# Patient Record
Sex: FEMALE | Race: WHITE | NOT HISPANIC OR LATINO
[De-identification: names, ages, dates, MRNs, and addresses within clinical notes are randomized per-mention and may not be internally consistent; named-entity substitution may affect disease eponyms.]

---

## 2017-01-20 ENCOUNTER — APPOINTMENT (OUTPATIENT)
Dept: HEART AND VASCULAR | Facility: CLINIC | Age: 42
End: 2017-01-20

## 2017-01-21 LAB
ALBUMIN SERPL ELPH-MCNC: 4.3 G/DL
ALP BLD-CCNC: 48 U/L
ALT SERPL-CCNC: 14 U/L
ANION GAP SERPL CALC-SCNC: 14 MMOL/L
AST SERPL-CCNC: 20 U/L
BASOPHILS # BLD AUTO: 0.01 K/UL
BASOPHILS NFR BLD AUTO: 0.1 %
BILIRUB SERPL-MCNC: 0.5 MG/DL
BUN SERPL-MCNC: 16 MG/DL
CALCIUM SERPL-MCNC: 9.1 MG/DL
CHLORIDE SERPL-SCNC: 98 MMOL/L
CHOLEST SERPL-MCNC: 258 MG/DL
CHOLEST/HDLC SERPL: 4 RATIO
CO2 SERPL-SCNC: 25 MMOL/L
CREAT SERPL-MCNC: 0.86 MG/DL
EOSINOPHIL # BLD AUTO: 0.1 K/UL
EOSINOPHIL NFR BLD AUTO: 1.4 %
GLUCOSE SERPL-MCNC: 85 MG/DL
HCT VFR BLD CALC: 42.9 %
HDLC SERPL-MCNC: 64 MG/DL
HGB BLD-MCNC: 14.2 G/DL
IMM GRANULOCYTES NFR BLD AUTO: 0.3 %
LDLC SERPL CALC-MCNC: 166 MG/DL
LYMPHOCYTES # BLD AUTO: 2.19 K/UL
LYMPHOCYTES NFR BLD AUTO: 31.2 %
MAN DIFF?: NORMAL
MCHC RBC-ENTMCNC: 30.5 PG
MCHC RBC-ENTMCNC: 33.1 GM/DL
MCV RBC AUTO: 92.3 FL
MONOCYTES # BLD AUTO: 0.37 K/UL
MONOCYTES NFR BLD AUTO: 5.3 %
NEUTROPHILS # BLD AUTO: 4.34 K/UL
NEUTROPHILS NFR BLD AUTO: 61.7 %
PLATELET # BLD AUTO: 184 K/UL
POTASSIUM SERPL-SCNC: 4.7 MMOL/L
PROT SERPL-MCNC: 7 G/DL
RBC # BLD: 4.65 M/UL
RBC # FLD: 12.4 %
SODIUM SERPL-SCNC: 137 MMOL/L
TRIGL SERPL-MCNC: 142 MG/DL
WBC # FLD AUTO: 7.03 K/UL

## 2017-01-23 ENCOUNTER — APPOINTMENT (OUTPATIENT)
Dept: HEART AND VASCULAR | Facility: CLINIC | Age: 42
End: 2017-01-23

## 2017-01-23 ENCOUNTER — RESULT REVIEW (OUTPATIENT)
Age: 42
End: 2017-01-23

## 2017-01-23 VITALS
SYSTOLIC BLOOD PRESSURE: 112 MMHG | DIASTOLIC BLOOD PRESSURE: 70 MMHG | HEIGHT: 62 IN | HEART RATE: 76 BPM | BODY MASS INDEX: 23.55 KG/M2 | RESPIRATION RATE: 16 BRPM | WEIGHT: 128 LBS

## 2017-01-23 RX ORDER — AZITHROMYCIN 250 MG/1
250 TABLET, FILM COATED ORAL
Qty: 6 | Refills: 0 | Status: DISCONTINUED | COMMUNITY
Start: 2016-11-11

## 2017-01-26 DIAGNOSIS — N39.3 STRESS INCONTINENCE (FEMALE) (MALE): ICD-10-CM

## 2017-01-26 DIAGNOSIS — Z86.19 PERSONAL HISTORY OF OTHER INFECTIOUS AND PARASITIC DISEASES: ICD-10-CM

## 2017-01-26 DIAGNOSIS — Z87.19 PERSONAL HISTORY OF OTHER DISEASES OF THE DIGESTIVE SYSTEM: ICD-10-CM

## 2017-01-26 DIAGNOSIS — K21.9 GASTRO-ESOPHAGEAL REFLUX DISEASE W/OUT ESOPHAGITIS: ICD-10-CM

## 2017-01-26 DIAGNOSIS — F41.8 OTHER SPECIFIED ANXIETY DISORDERS: ICD-10-CM

## 2017-01-26 DIAGNOSIS — N60.19 DIFFUSE CYSTIC MASTOPATHY OF UNSPECIFIED BREAST: ICD-10-CM

## 2017-01-26 DIAGNOSIS — Z80.9 FAMILY HISTORY OF MALIGNANT NEOPLASM, UNSPECIFIED: ICD-10-CM

## 2017-12-06 ENCOUNTER — RESULT REVIEW (OUTPATIENT)
Age: 42
End: 2017-12-06

## 2018-11-20 PROBLEM — N60.19 FIBROCYSTIC BREAST: Status: ACTIVE | Noted: 2018-11-20

## 2018-11-20 PROBLEM — N39.3 STRESS INCONTINENCE IN FEMALE: Status: ACTIVE | Noted: 2018-11-20

## 2018-11-20 PROBLEM — F41.8 ANXIETY ABOUT HEALTH: Status: ACTIVE | Noted: 2018-11-20

## 2018-11-20 PROBLEM — Z80.9 FAMILY HISTORY OF MALIGNANT NEOPLASM: Status: ACTIVE | Noted: 2018-11-20

## 2018-11-20 PROBLEM — Z86.19 HISTORY OF HPV INFECTION: Status: RESOLVED | Noted: 2018-11-20 | Resolved: 2018-11-20

## 2018-11-20 PROBLEM — K21.9 GERD (GASTROESOPHAGEAL REFLUX DISEASE): Status: ACTIVE | Noted: 2018-11-20

## 2018-11-20 PROBLEM — Z87.19 HISTORY OF RECTAL FISSURE: Status: RESOLVED | Noted: 2018-11-20 | Resolved: 2018-11-20

## 2018-11-20 LAB — CYTOLOGY CVX/VAG DOC THIN PREP: NORMAL

## 2018-12-13 ENCOUNTER — APPOINTMENT (OUTPATIENT)
Dept: OBGYN | Facility: CLINIC | Age: 43
End: 2018-12-13

## 2018-12-13 ENCOUNTER — APPOINTMENT (OUTPATIENT)
Dept: OBGYN | Facility: CLINIC | Age: 43
End: 2018-12-13
Payer: COMMERCIAL

## 2018-12-13 VITALS — SYSTOLIC BLOOD PRESSURE: 120 MMHG | BODY MASS INDEX: 24.88 KG/M2 | DIASTOLIC BLOOD PRESSURE: 80 MMHG | WEIGHT: 136 LBS

## 2018-12-13 DIAGNOSIS — Z01.419 ENCOUNTER FOR GYNECOLOGICAL EXAMINATION (GENERAL) (ROUTINE) W/OUT ABNORMAL FINDINGS: ICD-10-CM

## 2018-12-13 PROCEDURE — 99396 PREV VISIT EST AGE 40-64: CPT

## 2018-12-20 LAB
CYTOLOGY CVX/VAG DOC THIN PREP: NORMAL
HPV HIGH+LOW RISK DNA PNL CVX: NOT DETECTED

## 2019-04-09 ENCOUNTER — TRANSCRIPTION ENCOUNTER (OUTPATIENT)
Age: 44
End: 2019-04-09

## 2019-04-09 DIAGNOSIS — R92.2 INCONCLUSIVE MAMMOGRAM: ICD-10-CM

## 2019-04-09 DIAGNOSIS — Z12.31 ENCOUNTER FOR SCREENING MAMMOGRAM FOR MALIGNANT NEOPLASM OF BREAST: ICD-10-CM

## 2019-05-31 ENCOUNTER — RESULT REVIEW (OUTPATIENT)
Age: 44
End: 2019-05-31

## 2019-08-02 ENCOUNTER — TRANSCRIPTION ENCOUNTER (OUTPATIENT)
Age: 44
End: 2019-08-02

## 2019-12-18 ENCOUNTER — APPOINTMENT (OUTPATIENT)
Dept: OBGYN | Facility: CLINIC | Age: 44
End: 2019-12-18
Payer: COMMERCIAL

## 2019-12-18 VITALS
SYSTOLIC BLOOD PRESSURE: 120 MMHG | HEIGHT: 62 IN | DIASTOLIC BLOOD PRESSURE: 70 MMHG | WEIGHT: 135 LBS | BODY MASS INDEX: 24.84 KG/M2

## 2019-12-18 DIAGNOSIS — Z01.419 ENCOUNTER FOR GYNECOLOGICAL EXAMINATION (GENERAL) (ROUTINE) W/OUT ABNORMAL FINDINGS: ICD-10-CM

## 2019-12-18 PROCEDURE — 99396 PREV VISIT EST AGE 40-64: CPT

## 2019-12-20 NOTE — HISTORY OF PRESENT ILLNESS
[Good] : being in good health [1 Year Ago] : 1 year ago [Regular Exercise] : regular exercise [Healthy Diet] : a healthy diet [Definite:  ___ (Date)] : the last menstrual period was [unfilled] [Normal Amount/Duration] : was of a normal amount and duration [Regular Cycle Intervals] : periods have been regular [Menstrual Cramps] : no menstrual cramps [Spotting Between  Menses] : spotting reported between menses [Monogamous] : is monogamous [Sexually Active] : is sexually active

## 2020-01-02 LAB
CYTOLOGY CVX/VAG DOC THIN PREP: NORMAL
HPV HIGH+LOW RISK DNA PNL CVX: NOT DETECTED

## 2020-01-17 ENCOUNTER — APPOINTMENT (OUTPATIENT)
Dept: HEART AND VASCULAR | Facility: CLINIC | Age: 45
End: 2020-01-17
Payer: COMMERCIAL

## 2020-01-17 VITALS
DIASTOLIC BLOOD PRESSURE: 70 MMHG | SYSTOLIC BLOOD PRESSURE: 130 MMHG | OXYGEN SATURATION: 98 % | BODY MASS INDEX: 24.98 KG/M2 | WEIGHT: 141 LBS | HEIGHT: 63 IN | HEART RATE: 79 BPM | RESPIRATION RATE: 12 BRPM

## 2020-01-17 PROCEDURE — 99214 OFFICE O/P EST MOD 30 MIN: CPT

## 2020-01-17 RX ORDER — BETAMETHASONE DIPROPIONATE 0.5 MG/G
0.05 OINTMENT, AUGMENTED TOPICAL
Qty: 15 | Refills: 0 | Status: DISCONTINUED | COMMUNITY
Start: 2016-11-11 | End: 2020-01-17

## 2020-01-17 RX ORDER — ESCITALOPRAM OXALATE 10 MG/1
10 TABLET, FILM COATED ORAL DAILY
Refills: 0 | Status: DISCONTINUED | COMMUNITY
End: 2020-01-17

## 2020-01-17 RX ORDER — PANTOPRAZOLE 20 MG/1
20 TABLET, DELAYED RELEASE ORAL
Refills: 0 | Status: DISCONTINUED | COMMUNITY
End: 2020-01-17

## 2020-01-17 NOTE — DISCUSSION/SUMMARY
[Cardiomyopathy] : cardiomyopathy [Stable] : stable [Sodium Restriction] : sodium restriction [Likely Cardiac Ischemia (Hi Prob)] : likely cardiac ischemia (high probability) [Dietary Modification] : dietary modification [Exercise Regimen] : an exercise regimen [Hyperlipidemia] : hyperlipidemia [Deteriorating] : deteriorating [None] : none [Diet Modification] : diet modification [Exercise] : exercise [Patient] : the patient

## 2020-01-17 NOTE — HISTORY OF PRESENT ILLNESS
[FreeTextEntry1] : Mercedes Mcdowell returns for follow up.  She denies cp, sob, pnd, orthopnea, edema, palp, or loc.  \par \par She has intermittent dizziness with position changes.\par \par She is active but not exercising.  \par \par She is resistant to statins for lipid lowering at this time but lipids are markedly abnl.\par \par Lengthy discussion regarding CV risk factor management, goal setting and commitment to process.\par \par Will get updated CV evaluation for risk stratification and then make recommendation regarding medication and lifestyle modifications.

## 2020-01-17 NOTE — PHYSICAL EXAM
[General Appearance - Well Developed] : well developed [Normal Appearance] : normal appearance [Well Groomed] : well groomed [General Appearance - Well Nourished] : well nourished [No Deformities] : no deformities [General Appearance - In No Acute Distress] : no acute distress [Normal Conjunctiva] : the conjunctiva exhibited no abnormalities [Eyelids - No Xanthelasma] : the eyelids demonstrated no xanthelasmas [Normal Oral Mucosa] : normal oral mucosa [No Oral Pallor] : no oral pallor [No Oral Cyanosis] : no oral cyanosis [Normal Jugular Venous A Waves Present] : normal jugular venous A waves present [Normal Jugular Venous V Waves Present] : normal jugular venous V waves present [No Jugular Venous Storm A Waves] : no jugular venous storm A waves [Respiration, Rhythm And Depth] : normal respiratory rhythm and effort [Exaggerated Use Of Accessory Muscles For Inspiration] : no accessory muscle use [Auscultation Breath Sounds / Voice Sounds] : lungs were clear to auscultation bilaterally [Heart Rate And Rhythm] : heart rate and rhythm were normal [Heart Sounds] : normal S1 and S2 [Murmurs] : no murmurs present [Abdomen Soft] : soft [Abdomen Tenderness] : non-tender [Abdomen Mass (___ Cm)] : no abdominal mass palpated [Abnormal Walk] : normal gait [Gait - Sufficient For Exercise Testing] : the gait was sufficient for exercise testing [Nail Clubbing] : no clubbing of the fingernails [Cyanosis, Localized] : no localized cyanosis [Petechial Hemorrhages (___cm)] : no petechial hemorrhages [] : no rash [Skin Color & Pigmentation] : normal skin color and pigmentation [No Venous Stasis] : no venous stasis [Skin Lesions] : no skin lesions [No Skin Ulcers] : no skin ulcer [No Xanthoma] : no  xanthoma was observed

## 2020-01-23 ENCOUNTER — APPOINTMENT (OUTPATIENT)
Dept: HEART AND VASCULAR | Facility: CLINIC | Age: 45
End: 2020-01-23
Payer: COMMERCIAL

## 2020-01-23 VITALS
HEIGHT: 63 IN | WEIGHT: 141 LBS | HEART RATE: 88 BPM | DIASTOLIC BLOOD PRESSURE: 82 MMHG | BODY MASS INDEX: 24.98 KG/M2 | SYSTOLIC BLOOD PRESSURE: 104 MMHG

## 2020-01-23 PROCEDURE — 93325 DOPPLER ECHO COLOR FLOW MAPG: CPT

## 2020-01-23 PROCEDURE — 93351 STRESS TTE COMPLETE: CPT

## 2020-01-23 PROCEDURE — 93320 DOPPLER ECHO COMPLETE: CPT

## 2020-01-27 ENCOUNTER — RESULT REVIEW (OUTPATIENT)
Age: 45
End: 2020-01-27

## 2020-03-09 ENCOUNTER — APPOINTMENT (OUTPATIENT)
Dept: HEART AND VASCULAR | Facility: CLINIC | Age: 45
End: 2020-03-09
Payer: COMMERCIAL

## 2020-03-09 PROCEDURE — 94729 DIFFUSING CAPACITY: CPT

## 2020-03-09 PROCEDURE — 94727 GAS DIL/WSHOT DETER LNG VOL: CPT

## 2020-03-09 PROCEDURE — 94621 CARDIOPULM EXERCISE TESTING: CPT

## 2020-03-09 PROCEDURE — 94010 BREATHING CAPACITY TEST: CPT | Mod: 59

## 2020-03-13 ENCOUNTER — APPOINTMENT (OUTPATIENT)
Dept: HEART AND VASCULAR | Facility: CLINIC | Age: 45
End: 2020-03-13
Payer: COMMERCIAL

## 2020-03-13 ENCOUNTER — LABORATORY RESULT (OUTPATIENT)
Age: 45
End: 2020-03-13

## 2020-03-13 PROCEDURE — 36415 COLL VENOUS BLD VENIPUNCTURE: CPT

## 2020-03-14 LAB
ALBUMIN SERPL ELPH-MCNC: 4.5 G/DL
ALP BLD-CCNC: 52 U/L
ALT SERPL-CCNC: 12 U/L
ANION GAP SERPL CALC-SCNC: 15 MMOL/L
AST SERPL-CCNC: 17 U/L
BASOPHILS # BLD AUTO: 0.04 K/UL
BASOPHILS NFR BLD AUTO: 0.8 %
BILIRUB SERPL-MCNC: 0.4 MG/DL
BUN SERPL-MCNC: 16 MG/DL
CALCIUM SERPL-MCNC: 9.2 MG/DL
CHLORIDE SERPL-SCNC: 98 MMOL/L
CHOLEST SERPL-MCNC: 243 MG/DL
CHOLEST/HDLC SERPL: 4.1 RATIO
CO2 SERPL-SCNC: 22 MMOL/L
CREAT SERPL-MCNC: 0.81 MG/DL
EOSINOPHIL # BLD AUTO: 0.14 K/UL
EOSINOPHIL NFR BLD AUTO: 2.9 %
GLUCOSE SERPL-MCNC: 78 MG/DL
HCT VFR BLD CALC: 44.4 %
HDLC SERPL-MCNC: 60 MG/DL
HGB BLD-MCNC: 14 G/DL
IMM GRANULOCYTES NFR BLD AUTO: 0.2 %
LDLC SERPL CALC-MCNC: 159 MG/DL
LYMPHOCYTES # BLD AUTO: 2.01 K/UL
LYMPHOCYTES NFR BLD AUTO: 41.2 %
MAN DIFF?: NORMAL
MCHC RBC-ENTMCNC: 30.4 PG
MCHC RBC-ENTMCNC: 31.5 GM/DL
MCV RBC AUTO: 96.3 FL
MONOCYTES # BLD AUTO: 0.39 K/UL
MONOCYTES NFR BLD AUTO: 8 %
NEUTROPHILS # BLD AUTO: 2.29 K/UL
NEUTROPHILS NFR BLD AUTO: 46.9 %
PLATELET # BLD AUTO: 199 K/UL
POTASSIUM SERPL-SCNC: 4.3 MMOL/L
PROT SERPL-MCNC: 7.1 G/DL
RBC # BLD: 4.61 M/UL
RBC # FLD: 12.5 %
SODIUM SERPL-SCNC: 136 MMOL/L
TRIGL SERPL-MCNC: 121 MG/DL
WBC # FLD AUTO: 4.88 K/UL

## 2020-03-20 ENCOUNTER — APPOINTMENT (OUTPATIENT)
Dept: HEART AND VASCULAR | Facility: CLINIC | Age: 45
End: 2020-03-20
Payer: COMMERCIAL

## 2020-03-20 PROCEDURE — 99443: CPT

## 2020-06-24 ENCOUNTER — RESULT REVIEW (OUTPATIENT)
Age: 45
End: 2020-06-24

## 2020-07-22 ENCOUNTER — APPOINTMENT (OUTPATIENT)
Dept: HEART AND VASCULAR | Facility: CLINIC | Age: 45
End: 2020-07-22
Payer: COMMERCIAL

## 2020-07-22 PROCEDURE — 36415 COLL VENOUS BLD VENIPUNCTURE: CPT

## 2020-07-23 LAB
24R-OH-CALCIDIOL SERPL-MCNC: 73.6 PG/ML
ALBUMIN SERPL ELPH-MCNC: 4.4 G/DL
ALP BLD-CCNC: 50 U/L
ALT SERPL-CCNC: 12 U/L
ANION GAP SERPL CALC-SCNC: 11 MMOL/L
AST SERPL-CCNC: 18 U/L
BASOPHILS # BLD AUTO: 0.02 K/UL
BASOPHILS NFR BLD AUTO: 0.3 %
BILIRUB SERPL-MCNC: 0.5 MG/DL
BUN SERPL-MCNC: 12 MG/DL
CALCIUM SERPL-MCNC: 9.5 MG/DL
CHLORIDE SERPL-SCNC: 100 MMOL/L
CHOLEST SERPL-MCNC: 195 MG/DL
CHOLEST/HDLC SERPL: 2.8 RATIO
CO2 SERPL-SCNC: 27 MMOL/L
CREAT SERPL-MCNC: 0.8 MG/DL
EOSINOPHIL # BLD AUTO: 0.14 K/UL
EOSINOPHIL NFR BLD AUTO: 2.2 %
GLUCOSE SERPL-MCNC: 85 MG/DL
HCT VFR BLD CALC: 45.2 %
HDLC SERPL-MCNC: 69 MG/DL
HGB BLD-MCNC: 14.2 G/DL
IMM GRANULOCYTES NFR BLD AUTO: 0.3 %
LDLC SERPL CALC-MCNC: 100 MG/DL
LYMPHOCYTES # BLD AUTO: 2.23 K/UL
LYMPHOCYTES NFR BLD AUTO: 34.8 %
MAN DIFF?: NORMAL
MCHC RBC-ENTMCNC: 30.5 PG
MCHC RBC-ENTMCNC: 31.4 GM/DL
MCV RBC AUTO: 97 FL
MONOCYTES # BLD AUTO: 0.51 K/UL
MONOCYTES NFR BLD AUTO: 8 %
NEUTROPHILS # BLD AUTO: 3.48 K/UL
NEUTROPHILS NFR BLD AUTO: 54.4 %
PLATELET # BLD AUTO: 213 K/UL
POTASSIUM SERPL-SCNC: 4.5 MMOL/L
PROT SERPL-MCNC: 6.8 G/DL
RBC # BLD: 4.66 M/UL
RBC # FLD: 12.3 %
SODIUM SERPL-SCNC: 138 MMOL/L
TRIGL SERPL-MCNC: 130 MG/DL
WBC # FLD AUTO: 6.4 K/UL

## 2020-12-16 PROBLEM — Z01.419 ENCOUNTER FOR GYNECOLOGICAL EXAMINATION WITHOUT ABNORMAL FINDING: Status: RESOLVED | Noted: 2018-12-13 | Resolved: 2020-12-16

## 2020-12-21 PROBLEM — Z01.419 ENCOUNTER FOR ANNUAL ROUTINE GYNECOLOGICAL EXAMINATION: Status: RESOLVED | Noted: 2019-12-20 | Resolved: 2020-12-21

## 2021-01-22 ENCOUNTER — APPOINTMENT (OUTPATIENT)
Dept: OBGYN | Facility: CLINIC | Age: 46
End: 2021-01-22
Payer: COMMERCIAL

## 2021-01-22 VITALS
HEIGHT: 63 IN | WEIGHT: 142 LBS | DIASTOLIC BLOOD PRESSURE: 72 MMHG | BODY MASS INDEX: 25.16 KG/M2 | TEMPERATURE: 97.6 F | SYSTOLIC BLOOD PRESSURE: 110 MMHG

## 2021-01-22 DIAGNOSIS — Z11.51 ENCOUNTER FOR SCREENING FOR HUMAN PAPILLOMAVIRUS (HPV): ICD-10-CM

## 2021-01-22 DIAGNOSIS — N92.1 EXCESSIVE AND FREQUENT MENSTRUATION WITH IRREGULAR CYCLE: ICD-10-CM

## 2021-01-22 PROCEDURE — 99072 ADDL SUPL MATRL&STAF TM PHE: CPT

## 2021-01-22 PROCEDURE — 99396 PREV VISIT EST AGE 40-64: CPT

## 2021-01-26 PROBLEM — Z11.51 SCREENING FOR HPV (HUMAN PAPILLOMAVIRUS): Status: ACTIVE | Noted: 2019-12-20

## 2021-01-26 NOTE — HISTORY OF PRESENT ILLNESS
[FreeTextEntry1] : 45 y o P3 female presents for routine annual GYN care\par Last pap smear 12/2019 was NILM HPV negative\par Denies GYN complaints other than metrorrhagia noted over the last year\par Also increased UTI sx's which she attributes to her spouses semen irritating her: she used condoms regularly until recently when he had vasectomy\par Reports normal bowel/bladder function\par Breast imaging in 6/2020 was benign BI-RADS 1

## 2021-01-27 ENCOUNTER — RESULT REVIEW (OUTPATIENT)
Age: 46
End: 2021-01-27

## 2021-02-11 ENCOUNTER — TRANSCRIPTION ENCOUNTER (OUTPATIENT)
Age: 46
End: 2021-02-11

## 2021-03-08 ENCOUNTER — TRANSCRIPTION ENCOUNTER (OUTPATIENT)
Age: 46
End: 2021-03-08

## 2021-04-20 ENCOUNTER — NON-APPOINTMENT (OUTPATIENT)
Age: 46
End: 2021-04-20

## 2021-05-27 ENCOUNTER — APPOINTMENT (OUTPATIENT)
Dept: HEART AND VASCULAR | Facility: CLINIC | Age: 46
End: 2021-05-27
Payer: COMMERCIAL

## 2021-05-27 ENCOUNTER — NON-APPOINTMENT (OUTPATIENT)
Age: 46
End: 2021-05-27

## 2021-05-27 VITALS
HEIGHT: 63 IN | DIASTOLIC BLOOD PRESSURE: 64 MMHG | TEMPERATURE: 97.9 F | BODY MASS INDEX: 23.92 KG/M2 | OXYGEN SATURATION: 100 % | HEART RATE: 74 BPM | SYSTOLIC BLOOD PRESSURE: 100 MMHG | RESPIRATION RATE: 12 BRPM | WEIGHT: 135 LBS

## 2021-05-27 DIAGNOSIS — Z00.00 ENCOUNTER FOR GENERAL ADULT MEDICAL EXAMINATION W/OUT ABNORMAL FINDINGS: ICD-10-CM

## 2021-05-27 DIAGNOSIS — I51.89 OTHER ILL-DEFINED HEART DISEASES: ICD-10-CM

## 2021-05-27 PROCEDURE — 99072 ADDL SUPL MATRL&STAF TM PHE: CPT

## 2021-05-27 PROCEDURE — 99215 OFFICE O/P EST HI 40 MIN: CPT

## 2021-05-27 PROCEDURE — 93000 ELECTROCARDIOGRAM COMPLETE: CPT

## 2021-05-27 NOTE — REASON FOR VISIT
[Arrhythmia/ECG Abnorrmalities] : arrhythmia/ECG abnormalities [FreeTextEntry3] : Vaishali Sadler [Follow-Up - Clinic] : a clinic follow-up of [Cardiomyopathy] : cardiomyopathy [Coronary Artery Disease] : coronary artery disease [Hyperlipidemia] : hyperlipidemia

## 2021-05-27 NOTE — HISTORY OF PRESENT ILLNESS
[FreeTextEntry1] : Mercedes Mcdowell returns for follow up.  \par \par She denies cp, sob, pnd, orthopnea, edema, palp, or loc.  \par \par She is active.  She is compliant with meds\par \par ECG today reveals NST, TWI consider ischemia (unchanged).\par \par EXSE 1/2020: nl lv sys fxn; nl acosta fxn; 12:30 min Fede; no ischemia\par CPET 3/2020: ischemic myocardial dysfxn; 114% predicted peak VO2 (improved)\par \par Clinical hx and results reviewed in detail.\par \par Recommendations:\par 1. exercise\par 2. continue with meds\par 3. primary care follow up \par 4. blood work\par 5. EXSE\par 6. CPET

## 2021-05-27 NOTE — PHYSICAL EXAM
[General Appearance - Well Developed] : well developed [Normal Appearance] : normal appearance [Well Groomed] : well groomed [General Appearance - Well Nourished] : well nourished [No Deformities] : no deformities [General Appearance - In No Acute Distress] : no acute distress [Normal Conjunctiva] : the conjunctiva exhibited no abnormalities [Eyelids - No Xanthelasma] : the eyelids demonstrated no xanthelasmas [Normal Oral Mucosa] : normal oral mucosa [No Oral Pallor] : no oral pallor [No Oral Cyanosis] : no oral cyanosis [Normal Jugular Venous A Waves Present] : normal jugular venous A waves present [Normal Jugular Venous V Waves Present] : normal jugular venous V waves present [No Jugular Venous Storm A Waves] : no jugular venous storm A waves [Respiration, Rhythm And Depth] : normal respiratory rhythm and effort [Exaggerated Use Of Accessory Muscles For Inspiration] : no accessory muscle use [Auscultation Breath Sounds / Voice Sounds] : lungs were clear to auscultation bilaterally [Heart Rate And Rhythm] : heart rate and rhythm were normal [Heart Sounds] : normal S1 and S2 [Murmurs] : no murmurs present [Abdomen Soft] : soft [Abdomen Tenderness] : non-tender [Abdomen Mass (___ Cm)] : no abdominal mass palpated [Abnormal Walk] : normal gait [Gait - Sufficient For Exercise Testing] : the gait was sufficient for exercise testing [Nail Clubbing] : no clubbing of the fingernails [Cyanosis, Localized] : no localized cyanosis [Petechial Hemorrhages (___cm)] : no petechial hemorrhages [Skin Color & Pigmentation] : normal skin color and pigmentation [] : no rash [No Venous Stasis] : no venous stasis [Skin Lesions] : no skin lesions [No Skin Ulcers] : no skin ulcer [No Xanthoma] : no  xanthoma was observed

## 2021-05-27 NOTE — REVIEW OF SYSTEMS
[Joint Pain] : no joint pain [Joint Swelling] : no joint swelling [Joint Stiffness] : no joint stiffness [Muscle Cramps] : muscle cramps [Myalgia] : myalgia [Negative] : Heme/Lymph

## 2021-05-27 NOTE — DISCUSSION/SUMMARY
[ECG Normal Variant] : ECG normal variant [Myocardial Ischemia] : myocardial ischemia [Cardiomyopathy] : cardiomyopathy [Sodium Restriction] : sodium restriction [Possible Cardiac Ischemia (Intermd Prob)] : possible cardiac ischemia (intermediate probability) [Improving] : improving [Dietary Modification] : dietary modification [Exercise Regimen] : an exercise regimen [Hyperlipidemia] : hyperlipidemia [Stable] : stable [Responding to Treatment] : responding to treatment [None] : There are no changes in medication management [Diet Modification] : diet modification [Exercise] : exercise [Patient] : the patient

## 2021-05-28 ENCOUNTER — NON-APPOINTMENT (OUTPATIENT)
Age: 46
End: 2021-05-28

## 2021-05-28 LAB
ALBUMIN SERPL ELPH-MCNC: 4.6 G/DL
ALP BLD-CCNC: 54 U/L
ALT SERPL-CCNC: 15 U/L
ANION GAP SERPL CALC-SCNC: 12 MMOL/L
AST SERPL-CCNC: 16 U/L
BASOPHILS # BLD AUTO: 0.03 K/UL
BASOPHILS NFR BLD AUTO: 0.5 %
BILIRUB SERPL-MCNC: 0.4 MG/DL
BUN SERPL-MCNC: 12 MG/DL
CALCIUM SERPL-MCNC: 9.3 MG/DL
CHLORIDE SERPL-SCNC: 103 MMOL/L
CHOLEST SERPL-MCNC: 181 MG/DL
CO2 SERPL-SCNC: 24 MMOL/L
CREAT SERPL-MCNC: 0.78 MG/DL
EOSINOPHIL # BLD AUTO: 0.14 K/UL
EOSINOPHIL NFR BLD AUTO: 2.3 %
GLUCOSE SERPL-MCNC: 86 MG/DL
HCT VFR BLD CALC: 44.3 %
HDLC SERPL-MCNC: 67 MG/DL
HGB BLD-MCNC: 13.9 G/DL
IMM GRANULOCYTES NFR BLD AUTO: 0.3 %
LDLC SERPL CALC-MCNC: 87 MG/DL
LYMPHOCYTES # BLD AUTO: 2.07 K/UL
LYMPHOCYTES NFR BLD AUTO: 33.5 %
MAN DIFF?: NORMAL
MCHC RBC-ENTMCNC: 30.3 PG
MCHC RBC-ENTMCNC: 31.4 GM/DL
MCV RBC AUTO: 96.7 FL
MONOCYTES # BLD AUTO: 0.44 K/UL
MONOCYTES NFR BLD AUTO: 7.1 %
NEUTROPHILS # BLD AUTO: 3.48 K/UL
NEUTROPHILS NFR BLD AUTO: 56.3 %
NONHDLC SERPL-MCNC: 114 MG/DL
PLATELET # BLD AUTO: 198 K/UL
POTASSIUM SERPL-SCNC: 4.1 MMOL/L
PROT SERPL-MCNC: 6.8 G/DL
RBC # BLD: 4.58 M/UL
RBC # FLD: 12.6 %
SODIUM SERPL-SCNC: 138 MMOL/L
TRIGL SERPL-MCNC: 134 MG/DL
WBC # FLD AUTO: 6.18 K/UL

## 2021-06-25 ENCOUNTER — RESULT REVIEW (OUTPATIENT)
Age: 46
End: 2021-06-25

## 2021-07-23 ENCOUNTER — RX RENEWAL (OUTPATIENT)
Age: 46
End: 2021-07-23

## 2021-07-29 LAB
BACTERIA UR CULT: NORMAL
CYTOLOGY CVX/VAG DOC THIN PREP: NORMAL
HPV HIGH+LOW RISK DNA PNL CVX: NOT DETECTED

## 2021-08-03 ENCOUNTER — TRANSCRIPTION ENCOUNTER (OUTPATIENT)
Age: 46
End: 2021-08-03

## 2022-01-05 ENCOUNTER — APPOINTMENT (OUTPATIENT)
Dept: HEART AND VASCULAR | Facility: CLINIC | Age: 47
End: 2022-01-05
Payer: COMMERCIAL

## 2022-01-05 ENCOUNTER — LABORATORY RESULT (OUTPATIENT)
Age: 47
End: 2022-01-05

## 2022-01-05 ENCOUNTER — NON-APPOINTMENT (OUTPATIENT)
Age: 47
End: 2022-01-05

## 2022-01-05 VITALS
HEIGHT: 63 IN | BODY MASS INDEX: 23.74 KG/M2 | TEMPERATURE: 97.9 F | RESPIRATION RATE: 16 BRPM | HEART RATE: 80 BPM | SYSTOLIC BLOOD PRESSURE: 122 MMHG | OXYGEN SATURATION: 100 % | DIASTOLIC BLOOD PRESSURE: 84 MMHG | WEIGHT: 134 LBS

## 2022-01-05 DIAGNOSIS — U07.1 COVID-19: ICD-10-CM

## 2022-01-05 PROCEDURE — 93000 ELECTROCARDIOGRAM COMPLETE: CPT

## 2022-01-05 RX ORDER — FAMOTIDINE 10 MG/1
10 TABLET, FILM COATED ORAL
Refills: 0 | Status: DISCONTINUED | COMMUNITY
End: 2022-01-05

## 2022-01-06 ENCOUNTER — NON-APPOINTMENT (OUTPATIENT)
Age: 47
End: 2022-01-06

## 2022-01-06 LAB
ALBUMIN SERPL ELPH-MCNC: 4.8 G/DL
ALP BLD-CCNC: 57 U/L
ALT SERPL-CCNC: 13 U/L
ANION GAP SERPL CALC-SCNC: 14 MMOL/L
AST SERPL-CCNC: 12 U/L
BASOPHILS # BLD AUTO: 0.02 K/UL
BASOPHILS NFR BLD AUTO: 0.3 %
BILIRUB SERPL-MCNC: 0.5 MG/DL
BUN SERPL-MCNC: 10 MG/DL
CALCIUM SERPL-MCNC: 9.6 MG/DL
CHLORIDE SERPL-SCNC: 99 MMOL/L
CHOLEST SERPL-MCNC: 245 MG/DL
CO2 SERPL-SCNC: 25 MMOL/L
CREAT SERPL-MCNC: 0.81 MG/DL
EOSINOPHIL # BLD AUTO: 0.11 K/UL
EOSINOPHIL NFR BLD AUTO: 1.6 %
ESTIMATED AVERAGE GLUCOSE: 103 MG/DL
GLUCOSE SERPL-MCNC: 92 MG/DL
HBA1C MFR BLD HPLC: 5.2 %
HCT VFR BLD CALC: 43.6 %
HDLC SERPL-MCNC: 63 MG/DL
HGB BLD-MCNC: 14.1 G/DL
IMM GRANULOCYTES NFR BLD AUTO: 0.3 %
LDLC SERPL CALC-MCNC: 152 MG/DL
LYMPHOCYTES # BLD AUTO: 1.95 K/UL
LYMPHOCYTES NFR BLD AUTO: 27.5 %
MAN DIFF?: NORMAL
MCHC RBC-ENTMCNC: 30.3 PG
MCHC RBC-ENTMCNC: 32.3 GM/DL
MCV RBC AUTO: 93.8 FL
MONOCYTES # BLD AUTO: 0.46 K/UL
MONOCYTES NFR BLD AUTO: 6.5 %
NEUTROPHILS # BLD AUTO: 4.52 K/UL
NEUTROPHILS NFR BLD AUTO: 63.8 %
NONHDLC SERPL-MCNC: 182 MG/DL
PLATELET # BLD AUTO: 218 K/UL
POTASSIUM SERPL-SCNC: 4.6 MMOL/L
PROT SERPL-MCNC: 7.5 G/DL
RBC # BLD: 4.65 M/UL
RBC # FLD: 12.5 %
SODIUM SERPL-SCNC: 138 MMOL/L
T3FREE SERPL-MCNC: 2.64 PG/ML
T3RU NFR SERPL: 1 TBI
T4 SERPL-MCNC: 6.4 UG/DL
TRIGL SERPL-MCNC: 154 MG/DL
TSH SERPL-ACNC: 1.62 UIU/ML
WBC # FLD AUTO: 7.08 K/UL

## 2022-01-11 ENCOUNTER — APPOINTMENT (OUTPATIENT)
Dept: OBGYN | Facility: CLINIC | Age: 47
End: 2022-01-11

## 2022-01-24 ENCOUNTER — APPOINTMENT (OUTPATIENT)
Dept: HEART AND VASCULAR | Facility: CLINIC | Age: 47
End: 2022-01-24
Payer: COMMERCIAL

## 2022-01-24 VITALS
HEART RATE: 87 BPM | HEIGHT: 63 IN | BODY MASS INDEX: 23.92 KG/M2 | DIASTOLIC BLOOD PRESSURE: 76 MMHG | OXYGEN SATURATION: 98 % | SYSTOLIC BLOOD PRESSURE: 142 MMHG | WEIGHT: 135 LBS

## 2022-01-24 VITALS — DIASTOLIC BLOOD PRESSURE: 78 MMHG | SYSTOLIC BLOOD PRESSURE: 128 MMHG

## 2022-01-24 PROCEDURE — 93325 DOPPLER ECHO COLOR FLOW MAPG: CPT

## 2022-01-24 PROCEDURE — 93320 DOPPLER ECHO COMPLETE: CPT

## 2022-01-24 PROCEDURE — 93351 STRESS TTE COMPLETE: CPT

## 2022-01-26 ENCOUNTER — NON-APPOINTMENT (OUTPATIENT)
Age: 47
End: 2022-01-26

## 2022-03-21 ENCOUNTER — APPOINTMENT (OUTPATIENT)
Dept: OBGYN | Facility: CLINIC | Age: 47
End: 2022-03-21
Payer: COMMERCIAL

## 2022-03-21 VITALS
DIASTOLIC BLOOD PRESSURE: 70 MMHG | WEIGHT: 133 LBS | BODY MASS INDEX: 23.57 KG/M2 | SYSTOLIC BLOOD PRESSURE: 110 MMHG | HEIGHT: 63 IN

## 2022-03-21 DIAGNOSIS — Z12.11 ENCOUNTER FOR SCREENING FOR MALIGNANT NEOPLASM OF COLON: ICD-10-CM

## 2022-03-21 PROCEDURE — 99396 PREV VISIT EST AGE 40-64: CPT

## 2022-03-21 NOTE — HISTORY OF PRESENT ILLNESS
[TextBox_4] : 46 year old P3 for annual exam. No complaints. Regular periods.  s/p vasectomy. Requests annual Paps. Not yet due for mammogram. Has not yet had screening colonoscopy.

## 2022-04-04 LAB
CYTOLOGY CVX/VAG DOC THIN PREP: ABNORMAL
HPV HIGH+LOW RISK DNA PNL CVX: NOT DETECTED

## 2022-07-23 ENCOUNTER — RX RENEWAL (OUTPATIENT)
Age: 47
End: 2022-07-23

## 2022-08-10 ENCOUNTER — TRANSCRIPTION ENCOUNTER (OUTPATIENT)
Age: 47
End: 2022-08-10

## 2023-01-24 ENCOUNTER — NON-APPOINTMENT (OUTPATIENT)
Age: 48
End: 2023-01-24

## 2023-01-24 ENCOUNTER — APPOINTMENT (OUTPATIENT)
Dept: HEART AND VASCULAR | Facility: CLINIC | Age: 48
End: 2023-01-24
Payer: COMMERCIAL

## 2023-01-24 VITALS
TEMPERATURE: 97.3 F | BODY MASS INDEX: 23.74 KG/M2 | WEIGHT: 134 LBS | HEART RATE: 74 BPM | OXYGEN SATURATION: 98 % | RESPIRATION RATE: 16 BRPM | SYSTOLIC BLOOD PRESSURE: 110 MMHG | DIASTOLIC BLOOD PRESSURE: 80 MMHG | HEIGHT: 63 IN

## 2023-01-24 PROCEDURE — 93000 ELECTROCARDIOGRAM COMPLETE: CPT

## 2023-03-07 ENCOUNTER — APPOINTMENT (OUTPATIENT)
Dept: HEART AND VASCULAR | Facility: CLINIC | Age: 48
End: 2023-03-07
Payer: COMMERCIAL

## 2023-03-07 VITALS
BODY MASS INDEX: 24.1 KG/M2 | RESPIRATION RATE: 16 BRPM | OXYGEN SATURATION: 98 % | HEIGHT: 63 IN | DIASTOLIC BLOOD PRESSURE: 74 MMHG | HEART RATE: 79 BPM | WEIGHT: 136 LBS | SYSTOLIC BLOOD PRESSURE: 110 MMHG | TEMPERATURE: 97.7 F

## 2023-03-07 PROCEDURE — 93000 ELECTROCARDIOGRAM COMPLETE: CPT

## 2023-03-07 PROCEDURE — 99215 OFFICE O/P EST HI 40 MIN: CPT | Mod: 25

## 2023-03-07 RX ORDER — OMEPRAZOLE 40 MG/1
40 CAPSULE, DELAYED RELEASE ORAL
Refills: 0 | Status: ACTIVE | COMMUNITY

## 2023-03-10 ENCOUNTER — NON-APPOINTMENT (OUTPATIENT)
Age: 48
End: 2023-03-10

## 2023-03-10 NOTE — REASON FOR VISIT
[Arrhythmia/ECG Abnorrmalities] : arrhythmia/ECG abnormalities [Hyperlipidemia] : hyperlipidemia [Coronary Artery Disease] : coronary artery disease [FreeTextEntry3] : Vaishali Sadler

## 2023-03-10 NOTE — END OF VISIT
[de-identified] : Presenting today for interest in Tissue Gene study. History of right knee scope in 1995, previous ACL tear 20 years ago, intermittent injections with cortisone and Visco supplementation, last Synvisc about 5 years ago. His pain varies from 4-7/10, it is worse with weightbearing and ambulating activities, better with rest. No other complaints at this time.
[Time Spent: ___ minutes] : I have spent [unfilled] minutes of time on the encounter.

## 2023-03-10 NOTE — DISCUSSION/SUMMARY
[ECG Normal Variant] : ECG normal variant [Myocardial Ischemia] : myocardial ischemia [Possible Cardiac Ischemia (Intermd Prob)] : possible cardiac ischemia (intermediate probability) [Dietary Modification] : dietary modification [Exercise Regimen] : an exercise regimen [Hyperlipidemia] : hyperlipidemia [Stable] : stable [Diet Modification] : diet modification [Exercise] : exercise [Patient] : the patient [None] : There are no changes in medication management

## 2023-03-10 NOTE — REVIEW OF SYSTEMS
[Negative] : Heme/Lymph [Joint Pain] : no joint pain [Joint Swelling] : no joint swelling [Joint Stiffness] : no joint stiffness [Muscle Cramps] : no muscle cramps [Myalgia] : no myalgia [Confusion] : no confusion was observed [Memory Lapses Or Loss] : no memory lapses or loss [Depression] : no depression [Anxiety] : anxiety [Under Stress] : not under stress [Suicidal] : not suicidal

## 2023-03-10 NOTE — HISTORY OF PRESENT ILLNESS
[FreeTextEntry1] : Mercedes Mcdowell returns for follow up.  \par \par In January 2023, she was addressing several noncardiac medical issues, eg dental, L ear pain, sinus infection.  During this time, she was experiencing episodes of sob while at rest (sighing).  She also experienced L shoulder blade burning sensation.  BP check and ECG in this office were unremarkable.\par \par She denies exertional cp, sob, pnd, orthopnea, edema, palp, or loc.  \par \par She is active.  She is compliant with meds\par \par ECG 1/2023: NSR\par \par EXSE 1/2020: nl lv sys fxn; nl acosta fxn; 12:30 min Fede; no ischemia\par CPET 3/2020: ischemic myocardial dysfxn; 114% predicted peak VO2 (improved)\par \par Clinical hx  reviewed in detail.\par \par Recommendations:\par 1. exercise\par 2. continue with meds\par 3. primary care follow up \par 4. CPET\par 5. EXSE

## 2023-03-22 ENCOUNTER — APPOINTMENT (OUTPATIENT)
Dept: OBGYN | Facility: CLINIC | Age: 48
End: 2023-03-22
Payer: COMMERCIAL

## 2023-03-22 VITALS
WEIGHT: 136 LBS | HEIGHT: 63 IN | DIASTOLIC BLOOD PRESSURE: 70 MMHG | SYSTOLIC BLOOD PRESSURE: 118 MMHG | BODY MASS INDEX: 24.1 KG/M2

## 2023-03-22 PROCEDURE — 99396 PREV VISIT EST AGE 40-64: CPT

## 2023-03-22 NOTE — HISTORY OF PRESENT ILLNESS
[FreeTextEntry1] : 47year old  LMP 3/7/23 here for annual exam. No complaints. Regular periods.  s/p vasectomy. Requests annual Paps. Not yet due for mammogram. Has not yet had screening colonoscopy. \par \par OB History\par Total Pregnancies: 4, Full Term: 3, Living: 3,  Spontaneous: 1. \par  x 2, C/S for 3rd due to complete placenta previa- all boys [Mammogramdate] : 6/28/22 [TextBox_19] : BIRADS 2 [BreastSonogramDate] : 6/28/22 [TextBox_25] : BIRADS 2 [PapSmeardate] : 3/21/22 [TextBox_31] : Neg/HPV Neg [ColonoscopyDate] : cologuard 2020  [TextBox_43] : Negative

## 2023-04-05 LAB
CYTOLOGY CVX/VAG DOC THIN PREP: NORMAL
HPV HIGH+LOW RISK DNA PNL CVX: NOT DETECTED

## 2023-04-12 ENCOUNTER — APPOINTMENT (OUTPATIENT)
Dept: HEART AND VASCULAR | Facility: CLINIC | Age: 48
End: 2023-04-12
Payer: COMMERCIAL

## 2023-04-12 VITALS
SYSTOLIC BLOOD PRESSURE: 102 MMHG | HEART RATE: 80 BPM | DIASTOLIC BLOOD PRESSURE: 76 MMHG | BODY MASS INDEX: 24.1 KG/M2 | WEIGHT: 136 LBS | HEIGHT: 63 IN

## 2023-04-12 PROCEDURE — 94729 DIFFUSING CAPACITY: CPT

## 2023-04-12 PROCEDURE — 94727 GAS DIL/WSHOT DETER LNG VOL: CPT

## 2023-04-12 PROCEDURE — 94621 CARDIOPULM EXERCISE TESTING: CPT

## 2023-04-12 PROCEDURE — 94010 BREATHING CAPACITY TEST: CPT | Mod: 59

## 2023-04-20 ENCOUNTER — APPOINTMENT (OUTPATIENT)
Dept: HEART AND VASCULAR | Facility: CLINIC | Age: 48
End: 2023-04-20
Payer: COMMERCIAL

## 2023-04-20 VITALS
DIASTOLIC BLOOD PRESSURE: 66 MMHG | BODY MASS INDEX: 24.1 KG/M2 | SYSTOLIC BLOOD PRESSURE: 108 MMHG | OXYGEN SATURATION: 98 % | HEIGHT: 63 IN | WEIGHT: 136 LBS | HEART RATE: 95 BPM

## 2023-04-20 PROCEDURE — 93320 DOPPLER ECHO COMPLETE: CPT

## 2023-04-20 PROCEDURE — 93325 DOPPLER ECHO COLOR FLOW MAPG: CPT

## 2023-04-20 PROCEDURE — 93351 STRESS TTE COMPLETE: CPT

## 2023-04-25 ENCOUNTER — NON-APPOINTMENT (OUTPATIENT)
Age: 48
End: 2023-04-25

## 2023-06-27 ENCOUNTER — RESULT REVIEW (OUTPATIENT)
Age: 48
End: 2023-06-27

## 2023-07-03 ENCOUNTER — RX RENEWAL (OUTPATIENT)
Age: 48
End: 2023-07-03

## 2023-07-03 RX ORDER — ROSUVASTATIN CALCIUM 5 MG/1
5 TABLET, FILM COATED ORAL
Qty: 90 | Refills: 3 | Status: ACTIVE | COMMUNITY
Start: 2020-03-20 | End: 1900-01-01

## 2023-09-28 ENCOUNTER — APPOINTMENT (OUTPATIENT)
Dept: HEART AND VASCULAR | Facility: CLINIC | Age: 48
End: 2023-09-28
Payer: COMMERCIAL

## 2023-09-28 VITALS
HEIGHT: 63 IN | BODY MASS INDEX: 23.39 KG/M2 | RESPIRATION RATE: 16 BRPM | HEART RATE: 69 BPM | SYSTOLIC BLOOD PRESSURE: 136 MMHG | TEMPERATURE: 97.2 F | WEIGHT: 132 LBS | DIASTOLIC BLOOD PRESSURE: 86 MMHG | OXYGEN SATURATION: 99 %

## 2023-09-28 DIAGNOSIS — E78.5 HYPERLIPIDEMIA, UNSPECIFIED: ICD-10-CM

## 2023-09-28 DIAGNOSIS — R94.31 ABNORMAL ELECTROCARDIOGRAM [ECG] [EKG]: ICD-10-CM

## 2023-09-28 DIAGNOSIS — I25.9 CHRONIC ISCHEMIC HEART DISEASE, UNSPECIFIED: ICD-10-CM

## 2023-09-28 PROCEDURE — 99215 OFFICE O/P EST HI 40 MIN: CPT

## 2023-09-28 RX ORDER — METOPROLOL SUCCINATE 50 MG/1
50 TABLET, EXTENDED RELEASE ORAL
Qty: 2 | Refills: 0 | Status: ACTIVE | COMMUNITY
Start: 2023-09-28 | End: 1900-01-01

## 2023-09-29 ENCOUNTER — NON-APPOINTMENT (OUTPATIENT)
Age: 48
End: 2023-09-29

## 2023-09-29 LAB
ALBUMIN SERPL ELPH-MCNC: 4.6 G/DL
ALP BLD-CCNC: 54 U/L
ALT SERPL-CCNC: 13 U/L
ANION GAP SERPL CALC-SCNC: 16 MMOL/L
AST SERPL-CCNC: 17 U/L
BASOPHILS # BLD AUTO: 0.03 K/UL
BASOPHILS NFR BLD AUTO: 0.4 %
BILIRUB SERPL-MCNC: 0.4 MG/DL
BUN SERPL-MCNC: 14 MG/DL
CALCIUM SERPL-MCNC: 9.5 MG/DL
CHLORIDE SERPL-SCNC: 101 MMOL/L
CHOLEST SERPL-MCNC: 201 MG/DL
CO2 SERPL-SCNC: 23 MMOL/L
CREAT SERPL-MCNC: 0.79 MG/DL
EGFR: 93 ML/MIN/1.73M2
EOSINOPHIL # BLD AUTO: 0.12 K/UL
EOSINOPHIL NFR BLD AUTO: 1.6 %
ESTIMATED AVERAGE GLUCOSE: 108 MG/DL
GLUCOSE SERPL-MCNC: 94 MG/DL
HBA1C MFR BLD HPLC: 5.4 %
HCT VFR BLD CALC: 45.2 %
HDLC SERPL-MCNC: 62 MG/DL
HGB BLD-MCNC: 14.2 G/DL
IMM GRANULOCYTES NFR BLD AUTO: 0.1 %
LDLC SERPL CALC-MCNC: 119 MG/DL
LYMPHOCYTES # BLD AUTO: 1.84 K/UL
LYMPHOCYTES NFR BLD AUTO: 25.3 %
MAN DIFF?: NORMAL
MCHC RBC-ENTMCNC: 30.3 PG
MCHC RBC-ENTMCNC: 31.4 GM/DL
MCV RBC AUTO: 96.6 FL
MONOCYTES # BLD AUTO: 0.56 K/UL
MONOCYTES NFR BLD AUTO: 7.7 %
NEUTROPHILS # BLD AUTO: 4.72 K/UL
NEUTROPHILS NFR BLD AUTO: 64.9 %
NONHDLC SERPL-MCNC: 139 MG/DL
PLATELET # BLD AUTO: 239 K/UL
POTASSIUM SERPL-SCNC: 5.2 MMOL/L
PROT SERPL-MCNC: 7.4 G/DL
RBC # BLD: 4.68 M/UL
RBC # FLD: 12.8 %
SODIUM SERPL-SCNC: 140 MMOL/L
TRIGL SERPL-MCNC: 113 MG/DL
TSH SERPL-ACNC: 1.65 UIU/ML
WBC # FLD AUTO: 7.28 K/UL

## 2023-10-27 ENCOUNTER — RESULT REVIEW (OUTPATIENT)
Age: 48
End: 2023-10-27

## 2023-11-01 ENCOUNTER — NON-APPOINTMENT (OUTPATIENT)
Age: 48
End: 2023-11-01

## 2024-03-18 ENCOUNTER — APPOINTMENT (OUTPATIENT)
Dept: HEART AND VASCULAR | Facility: CLINIC | Age: 49
End: 2024-03-18

## 2024-04-13 ENCOUNTER — NON-APPOINTMENT (OUTPATIENT)
Age: 49
End: 2024-04-13

## 2024-04-18 ENCOUNTER — APPOINTMENT (OUTPATIENT)
Dept: OBGYN | Facility: CLINIC | Age: 49
End: 2024-04-18
Payer: COMMERCIAL

## 2024-04-18 VITALS
HEIGHT: 63 IN | BODY MASS INDEX: 24.45 KG/M2 | DIASTOLIC BLOOD PRESSURE: 70 MMHG | WEIGHT: 138 LBS | SYSTOLIC BLOOD PRESSURE: 110 MMHG

## 2024-04-18 DIAGNOSIS — Z01.419 ENCOUNTER FOR GYNECOLOGICAL EXAMINATION (GENERAL) (ROUTINE) W/OUT ABNORMAL FINDINGS: ICD-10-CM

## 2024-04-18 PROCEDURE — 99396 PREV VISIT EST AGE 40-64: CPT

## 2024-04-18 NOTE — HISTORY OF PRESENT ILLNESS
[FreeTextEntry1] : 48year old  LMP 3/23/24 here for annual exam. No complaints. Menses remain very regular with heaviest flow 1st day then tapers off by day 4.  s/p vasectomy. Requests annual Paps.24. GM had breast Ca at age 77   OB History Total Pregnancies: 4, Full Term: 3, Living: 3,  Spontaneous: 1.  x 2, C/S for 3rd due to complete placenta previa- all boys   Preventative Visit:   Mammogram:23, BIRADS 2 scattered fibroglandular densities.   Breast Sonogram: 23, BIRADS 2.   PAP Smear: 3/22/23, Neg/HPV Neg.   Colonoscopy: 24 Negative

## 2024-04-22 LAB — HPV HIGH+LOW RISK DNA PNL CVX: NOT DETECTED

## 2024-04-27 LAB — CYTOLOGY CVX/VAG DOC THIN PREP: NORMAL

## 2024-05-21 NOTE — PLAN
RHEUMATOLOGY OFFICE VISIT    Name: Kaya Aparicio  : 1952  Date: 2024      ASSESSMENT  Kaya was seen today for office visit.    Diagnoses and all orders for this visit:    Undifferentiated connective tissue disease  (CMD)  -     CBC with Automated Differential; Future  -     Comprehensive Metabolic Panel; Future    Skin lesion  -     SERVICE TO DERMATOLOGY    Autoimmune hepatitis  (CMD)  -     CBC with Automated Differential; Future  -     Comprehensive Metabolic Panel; Future    High risk medication use  -     CBC with Automated Differential; Future  -     Comprehensive Metabolic Panel; Future    Leukopenia, unspecified type          PLAN:  Undifferentiated Connective Tissue Disease  Inflammatory Arthritis   ?Raynaud's  -Positive ANURADHA, 1:640, +RNP, normal complements    -previously on plaquenil (stopped due to leukopenia/thrombocytopenia, can be restarted if indicated per heme/onc)  -on azathioprine   -patient currently reports minimal joint pain and stiffness, will continue to monitor off plaquenil for now with low threshold to restart   -updated labs ordered    Autoimmune Hepatitis:  -diagnosed in   -s/p liver biopsy 2018 that showed autoimmune hepatitis  -following with GI  -currently on azathioprine and ursodiol  -MRI liver 2024 showed cirrhosis with signs of portal HTN    Neck and upper back pain:  -tizanidine prn  -completed PT, cont home exercises and stretches      Leukopenia and thrombocytopenia:  -did not improve after stopping the plaquenil, was also present before plaquenil was started   -borderline low B12, taking oral supplement  -following with heme/once    Left Trigger Finger:  -topical voltaren gel prn  -trigger finger splint     Epidermal cyst on right flank:  -s/p intra lesion steroid injection 2023  -cyst has returned, referral given for patient to follow up with derm        Follow up in 3 months      Marnie Claudio DO [facility]       Subjective   Kaya is a 72 year  old female with PMHx of CAD, CHF, GERD, DM, autoimmune hepatitis, undifferentiated connective tissue disease/inflammatory arthritis  who presents for follow up     5/21/24  Feels well overall  Has been doing different exercises through a program in her library    Occasional stiffness in her hands    The epidermal cyst on her right flank is starting to grow again, had a intralesional steroid injection done with derm last year     2/22/24  Doing well overall  Can have occasional pain in her hands, mainly in her thumbs   Recently saw GI, has MRI liver ordered     11/20/23  Neck and back pain did improve with PT  Has been doing exercising on her own     8/21/23  Joint pain overall well controlled   Has pain on and off on right side of neck and upper back  Ankles can swell when she stands for a long time    5/22/23  Doing well overall  Has some stiffness in her hands, does not last very long  No joint swelling   S/p EGD and colonoscopy 3/2023    2/13/23  Some good days and some bad days with joint stiffness  Usually feels better after stretching in the morning  Unable to complete aqua therapy after her last visit because of a skin boil that was draining, boil is still there so would like to see dermatology       Right hand trigger finger has improved, still has triggering in left middle finger     Denies any joint swelling, rashes, oral or nasal sores, or Raynaud's    9/22/22  Having some pain in her left shoulder that started a week ago    Had initial visit with aqua therapy last week, will be starting her sessions next week      +triggering in both middle fingers     6/23/22  Has been getting massage therapy and cupping for the last couple of months, has been helping with her muscle stiffness   Currently denies any joint pain  Does have chronic stiffness in her neck and upper shoulders     4/7/22  Feels well overall   No new complaints    2/10/22  Was previously following with Dr. Knox, last visit July 2021    Started  having joint stiffness in hands, neck, knees, and shoulders in 2019  symptoms were steroid responsive   Was started on plaquenil, which did help with her hand pain and stiffness, was discontinued due to low wbcs  An injection medication was discussed but patient was hesitant to start it   Saw ortho for left shoulder pain and received CSI, which helped with shoulder range of motion     Currently has mild stiffness in left hand, left third finger triggers   Gets pinprick feeling going up her left arm to her neck     Denies any rashes, photosensitive rashes, oral or nasal sores, hair loss, dry mouth  +dry eyes  +Fingertips can feel very cold but denies any color change     Diagnosed with autoimmune hepatitis in 2011, s/p liver biopsy 6/2018 that showed autoimmune hepatitis, following with GI, currently on azathioprine and ursodiol     Family Hx: no known family history of connective tissue disease    Social Hx: former smoker, rare alcohol use, no illicit drug use    Past Medical History:   Diagnosis Date    Arthritis     Autoimmune hepatitis  (CMD)     Cirrhosis  (CMD)     Coronary artery disease     Diabetes mellitus  (CMD)     Difficult intubation     Essential (primary) hypertension     Failed moderate sedation during procedure     GERD (gastroesophageal reflux disease)     Hyperlipoproteinemia     Malignant hyperthermia     Obesity (BMI 30-39.9)     PONV (postoperative nausea and vomiting)     Thyroid disease       Past Surgical History:   Procedure Laterality Date    Biopsy of breast, incisional Right     Bunionectomy Right     Cardiac catherization  10/22/2019    Colonoscopy  03/14/2023    repeat 5-7 years    Coronary angioplasty with stent placement      Egd  03/14/2023    repeat 2-3 years given cirrhosis    Eye surgery      bilateral cataracts and detatched retina    Laparoscopic tubal ligation w/ filchie clips        Social History     Socioeconomic History    Marital status: Single     Spouse name: Not on  file    Number of children: Not on file    Years of education: Not on file    Highest education level: Not on file   Occupational History    Not on file   Tobacco Use    Smoking status: Former     Types: Cigarettes    Smokeless tobacco: Never   Vaping Use    Vaping status: never used   Substance and Sexual Activity    Alcohol use: Yes     Comment: ocassionally    Drug use: No    Sexual activity: Not Currently     Birth control/protection: PPTL     Comment: T/L   Other Topics Concern    Not on file   Social History Narrative    Not on file     Social Determinants of Health     Financial Resource Strain: Not on file   Food Insecurity: Not on file   Transportation Needs: No Transportation Needs (3/5/2020)    PRAPARE - Transportation     Lack of Transportation (Medical): No     Lack of Transportation (Non-Medical): No   Physical Activity: Inactive (3/5/2020)    Exercise Vital Sign     Days of Exercise per Week: 0 days     Minutes of Exercise per Session: 0 min   Stress: Low Risk  (4/1/2021)    Stress     How Stressed: Not on file   Social Connections: Not on file   Interpersonal Safety: Not on file   .  Family History   Problem Relation Age of Onset    Cancer Sister           Allergies:  ALLERGIES:  Atorvastatin calcium, Demerol, Morphine, and Atorvastatin    Current Outpatient Medications   Medication Sig Dispense Refill    metFORMIN (GLUCOPHAGE-XR) 500 MG 24 hr tablet Take 1 tablet by mouth daily (with breakfast). 90 tablet 0    ursodiol (ACTIGALL) 300 MG capsule TAKE 5 CAPSULES DAILY 450 capsule 1    levothyroxine 112 MCG tablet TAKE 1 TABLET BY MOUTH DAILY 90 tablet 3    potassium chloride (KLOR-CON) 10 MEQ ER tablet Take 1 tablet by mouth daily. 90 tablet 3    furosemide (LASIX) 20 MG tablet Take 1 tablet by mouth daily. 90 tablet 3    aspirin (Aspirin Low Dose) 81 MG EC tablet Take 1 tablet by mouth daily. 90 tablet 3    azaTHIOprine (IMURAN) 50 MG tablet TAKE 2 TABLETS DAILY 180 tablet 0    simvastatin (ZOCOR) 40  MG tablet Take 1 tablet by mouth nightly. 90 tablet 3    mometasone (NASONEX) 50 MCG/ACT nasal spray USE 2 SPRAYS IN EACH NOSTRIL ONCE DAILY AS NEEDED 51 g 3    metoPROLOL succinate (TOPROL-XL) 50 MG 24 hr tablet TAKE 1 TABLET DAILY 90 tablet 3    tiZANidine (ZANAFLEX) 2 MG tablet Take 1 tablet by mouth nightly as needed for Muscle spasms. 30 tablet 1    ezetimibe (ZETIA) 10 MG tablet Take 1 tablet by mouth daily. Indications: Disease involving Lipid Deposits in the Arteries, Ischemic Heart Disease 90 tablet 3    valACYclovir (VALTREX) 500 MG tablet Take 1 tablet by mouth daily. Increase to bid x 3 days with outbreak. 90 tablet 3    Insulin Pen Needle (B-D U/F PEN NEEDLE 5/16\") 31G X 8 MM Misc USE DAILY 90 each 3    famotidine (PEPCID) 20 MG tablet Take 1 tablet by mouth in the morning and 1 tablet before bedtime. 30 tablet 0    diclofenac (VOLTAREN) 1 % gel Apply 2 g topically 4 times daily as needed (pain). 100 g 3    acetaminophen (TYLENOL) 500 MG tablet 2 tablet as needed      Blood Glucose Monitoring Suppl w/Device Kit On call glucometer. Checks sugars 3 times per day . 1 kit 0    albuterol 108 (90 Base) MCG/ACT inhaler Inhale 2 puffs into the lungs every 4 hours as needed for Shortness of Breath or Wheezing. Indications: Asthma       Lancets (ACCU-CHEK MULTICLIX) Misc 2/day       No current facility-administered medications for this visit.       Review of Systems   Constitutional:  Negative for chills, fever, malaise/fatigue and weight loss.   HENT:  Negative for congestion, sinus pain and sore throat.    Eyes:  Negative for pain and redness.   Respiratory:  Negative for cough and shortness of breath.    Cardiovascular:  Negative for chest pain, palpitations and leg swelling.   Gastrointestinal:  Negative for abdominal pain, constipation, diarrhea, heartburn, nausea and vomiting.   Genitourinary:  Negative for dysuria and hematuria.   Musculoskeletal:  Positive for joint pain. Negative for back pain, myalgias  and neck pain.   Skin:  Negative for itching and rash.   Neurological:  Negative for weakness and headaches.        Objective   Visit Vitals  /60 (BP Location: LUE - Left upper extremity, Patient Position: Sitting)   Pulse 70   Temp 98 °F (36.7 °C) (Tympanic)   Resp 16   Ht 5' 6\" (1.676 m)   Wt 85.7 kg (189 lb)   LMP  (LMP Unknown) Comment: Tubal   BMI 30.51 kg/m²     Physical Exam  Constitutional:       Appearance: Normal appearance.   HENT:      Head: Normocephalic and atraumatic.      Nose: Nose normal.   Cardiovascular:      Rate and Rhythm: Normal rate and regular rhythm.   Pulmonary:      Breath sounds: Normal breath sounds.   Abdominal:      Palpations: Abdomen is soft.      Tenderness: There is no abdominal tenderness.   Musculoskeletal:      Right shoulder: No tenderness. Normal range of motion.      Left shoulder: No tenderness. Normal range of motion.      Cervical back: Neck supple.      Comments:   Musculoskeletal exam:  Bilateral elbows no synovitis or tenderness  Bilateral wrist joints no synovitis or tenderness  Bilateral MCP joints no synovitis or tenderness  Bilateral PIP joints no synovitis and tenderness  Bilateral DIP joints no synovitis or tenderness  Both knees no effusion warmth or tenderness  Both ankles no synovitis or tenderness  Bilateral MTP joints no synovitis or tenderness    Skin:     Nails: There is no clubbing.   Neurological:      Mental Status: She is alert and oriented to person, place, and time.           LABS  HGB (g/dL)   Date Value   02/22/2024 12.8     HCT (%)   Date Value   02/22/2024 36.3     WBC (K/mcL)   Date Value   02/22/2024 3.4 (L)     PLT (K/mcL)   Date Value   02/22/2024 163      Glucose (mg/dL)   Date Value   12/14/2023 164 (H)     BUN (mg/dL)   Date Value   12/14/2023 12     Creatinine (mg/dL)   Date Value   12/14/2023 0.81     Calcium (mg/dL)   Date Value   12/14/2023 9.2     Bilirubin, Total (mg/dL)   Date Value   12/14/2023 0.6     GPT/ALT (Units/L)    Date Value   12/14/2023 11     GOT/AST (Units/L)   Date Value   12/14/2023 8     Alkaline Phosphatase (Units/L)   Date Value   12/14/2023 132 (H)     PROTIME-PT (sec)   Date Value   10/08/2020 10.9     Albumin (g/dL)   Date Value   12/14/2023 3.4 (L)     Sodium (mmol/L)   Date Value   12/14/2023 143     Potassium (mmol/L)   Date Value   12/14/2023 4.8     Chloride (mmol/L)   Date Value   12/14/2023 108     Carbon Dioxide (mmol/L)   Date Value   12/14/2023 28     No components found for: \"SEDRATE\"  Lab Results   Component Value Date/Time    RA <10 02/12/2020 10:56 AM           IMAGING  Accession #     [id number]     EXAM: XR HAND GHANSHYAM MIN 3V     CLINICAL INDICATION: Bilateral hand pain.     COMPARISON: No previous examination for comparison.     IMPRESSION:  FINDINGS/IMPRESSION:     RIGHT HAND: Mild narrowing of the interphalangeal joints 2nd through 5th digits, small dorsal   spur from the base of the 3rd distal phalanx and small spurs 1st carpometacarpal joint   compatible with degenerative change.     Diffuse osteopenia compatible with osteoporosis.     Vascular calcifications compatible with atherosclerosis.     LEFT HAND: Mild narrowing of the interphalangeal joints 2nd through 5th digits and mild narrowing   of the 5th metacarpal phalangeal joint compatible with degenerative change.     Mild diffuse osteopenia compatible with osteoporosis.     Vascular calcifications compatible with atherosclerosis.        02/12/20 2:04 pm     Dictated By:            CONNIE MAO MD     Electronically Reviewed and Approved By:           CONNIE MAO MD  02/12/20 2:08 pm      [id number]     EXAM: XR SHOULDER GHANSHYAM 3V     CLINICAL INDICATION: Bilateral shoulder pain.     COMPARISON: None.     IMPRESSION:  FINDINGS/IMPRESSION:     Right shoulder: Subtle calcific density projects over the subacromial space may be compatible   with calcific tendinitis or small calcified loose body.     Left shoulder: Small  inferior acromial spur.     Endplate spurs mid thoracic vertebra compatible with endplate degenerative change.     Mural calcifications thoracic aorta compatible with atherosclerosis.         02/12/20 2:16 pm     Dictated By:            CONNIE AMO MD     Electronically Reviewed and Approved By:           CONNIE MAO MD  02/12/20 2:19 pm     ELECTRONICALLY SIGNED BY: Marnie Claudio DO          [[email or web address]] : Pap done. Referral for colonoscopy given. Return in one year or sooner PRN.

## 2025-01-06 ENCOUNTER — NON-APPOINTMENT (OUTPATIENT)
Age: 50
End: 2025-01-06

## 2025-01-06 ENCOUNTER — APPOINTMENT (OUTPATIENT)
Dept: HEART AND VASCULAR | Facility: CLINIC | Age: 50
End: 2025-01-06
Payer: COMMERCIAL

## 2025-01-06 VITALS
OXYGEN SATURATION: 99 % | BODY MASS INDEX: 22.68 KG/M2 | HEIGHT: 63 IN | HEART RATE: 84 BPM | WEIGHT: 128 LBS | SYSTOLIC BLOOD PRESSURE: 136 MMHG | DIASTOLIC BLOOD PRESSURE: 80 MMHG | RESPIRATION RATE: 16 BRPM

## 2025-01-06 PROCEDURE — 93000 ELECTROCARDIOGRAM COMPLETE: CPT

## 2025-01-06 PROCEDURE — 99213 OFFICE O/P EST LOW 20 MIN: CPT | Mod: 25

## 2025-01-06 PROCEDURE — 99203 OFFICE O/P NEW LOW 30 MIN: CPT | Mod: 25

## 2025-01-06 RX ORDER — PNV NO.95/FERROUS FUM/FOLIC AC 28MG-0.8MG
TABLET ORAL
Refills: 0 | Status: ACTIVE | COMMUNITY

## 2025-01-06 RX ORDER — ROSUVASTATIN CALCIUM 10 MG/1
10 TABLET, FILM COATED ORAL
Qty: 90 | Refills: 3 | Status: ACTIVE | COMMUNITY
Start: 1900-01-01 | End: 1900-01-01

## 2025-03-17 ENCOUNTER — APPOINTMENT (OUTPATIENT)
Dept: HEART AND VASCULAR | Facility: CLINIC | Age: 50
End: 2025-03-17
Payer: COMMERCIAL

## 2025-03-17 PROCEDURE — 36415 COLL VENOUS BLD VENIPUNCTURE: CPT

## 2025-03-19 LAB
ALBUMIN SERPL ELPH-MCNC: 3.9 G/DL
ALP BLD-CCNC: 50 U/L
ALT SERPL-CCNC: 10 U/L
ANION GAP SERPL CALC-SCNC: 10 MMOL/L
AST SERPL-CCNC: 16 U/L
BILIRUB SERPL-MCNC: 0.2 MG/DL
BUN SERPL-MCNC: 20 MG/DL
CALCIUM SERPL-MCNC: 8.8 MG/DL
CHLORIDE SERPL-SCNC: 102 MMOL/L
CHOLEST SERPL-MCNC: 175 MG/DL
CO2 SERPL-SCNC: 26 MMOL/L
CREAT SERPL-MCNC: 0.78 MG/DL
CRP SERPL HS-MCNC: 0.28 MG/L
EGFRCR SERPLBLD CKD-EPI 2021: 93 ML/MIN/1.73M2
GLUCOSE SERPL-MCNC: 92 MG/DL
HDLC SERPL-MCNC: 58 MG/DL
LDLC SERPL-MCNC: 94 MG/DL
NONHDLC SERPL-MCNC: 117 MG/DL
POTASSIUM SERPL-SCNC: 4.5 MMOL/L
PROT SERPL-MCNC: 6.7 G/DL
SODIUM SERPL-SCNC: 138 MMOL/L
TRIGL SERPL-MCNC: 129 MG/DL

## 2025-03-20 LAB — APO LP(A) SERPL-MCNC: 14.7 NMOL/L

## 2025-05-21 ENCOUNTER — APPOINTMENT (OUTPATIENT)
Dept: OBGYN | Facility: CLINIC | Age: 50
End: 2025-05-21
Payer: COMMERCIAL

## 2025-05-21 VITALS
BODY MASS INDEX: 21.97 KG/M2 | DIASTOLIC BLOOD PRESSURE: 79 MMHG | WEIGHT: 124 LBS | SYSTOLIC BLOOD PRESSURE: 150 MMHG | HEIGHT: 63 IN

## 2025-05-21 DIAGNOSIS — Z01.419 ENCOUNTER FOR GYNECOLOGICAL EXAMINATION (GENERAL) (ROUTINE) W/OUT ABNORMAL FINDINGS: ICD-10-CM

## 2025-05-21 DIAGNOSIS — N88.8 OTHER SPECIFIED NONINFLAMMATORY DISORDERS OF CERVIX UTERI: ICD-10-CM

## 2025-05-21 DIAGNOSIS — N39.0 URINARY TRACT INFECTION, SITE NOT SPECIFIED: ICD-10-CM

## 2025-05-21 DIAGNOSIS — N84.1 POLYP OF CERVIX UTERI: ICD-10-CM

## 2025-05-21 PROCEDURE — 99396 PREV VISIT EST AGE 40-64: CPT

## 2025-05-21 PROCEDURE — 57500 BIOPSY OF CERVIX: CPT

## 2025-05-21 RX ORDER — NITROFURANTOIN (MONOHYDRATE/MACROCRYSTALS) 25; 75 MG/1; MG/1
100 CAPSULE ORAL
Qty: 30 | Refills: 5 | Status: ACTIVE | COMMUNITY
Start: 2025-05-21 | End: 1900-01-01

## 2025-05-24 LAB — HPV HIGH+LOW RISK DNA PNL CVX: NOT DETECTED

## 2025-05-26 LAB — CYTOLOGY CVX/VAG DOC THIN PREP: ABNORMAL

## 2025-05-28 ENCOUNTER — TRANSCRIPTION ENCOUNTER (OUTPATIENT)
Age: 50
End: 2025-05-28

## 2025-05-28 LAB — CORE LAB BIOPSY: NORMAL

## 2025-07-09 ENCOUNTER — APPOINTMENT (OUTPATIENT)
Dept: HEART AND VASCULAR | Facility: CLINIC | Age: 50
End: 2025-07-09

## 2025-07-09 VITALS
DIASTOLIC BLOOD PRESSURE: 76 MMHG | HEIGHT: 63 IN | RESPIRATION RATE: 16 BRPM | HEART RATE: 81 BPM | WEIGHT: 126 LBS | OXYGEN SATURATION: 99 % | SYSTOLIC BLOOD PRESSURE: 110 MMHG | BODY MASS INDEX: 22.32 KG/M2

## 2025-07-09 PROCEDURE — 93000 ELECTROCARDIOGRAM COMPLETE: CPT

## 2025-07-09 PROCEDURE — 99213 OFFICE O/P EST LOW 20 MIN: CPT | Mod: 25

## 2025-07-10 LAB
25(OH)D3 SERPL-MCNC: 37.7 NG/ML
ALBUMIN SERPL ELPH-MCNC: 4.5 G/DL
ALP BLD-CCNC: 64 U/L
ALT SERPL-CCNC: 19 U/L
ANION GAP SERPL CALC-SCNC: 14 MMOL/L
AST SERPL-CCNC: 19 U/L
BILIRUB SERPL-MCNC: 0.7 MG/DL
BUN SERPL-MCNC: 14 MG/DL
CALCIUM SERPL-MCNC: 9.4 MG/DL
CHLORIDE SERPL-SCNC: 98 MMOL/L
CHOLEST SERPL-MCNC: 219 MG/DL
CK SERPL-CCNC: 39 U/L
CO2 SERPL-SCNC: 25 MMOL/L
CREAT SERPL-MCNC: 0.82 MG/DL
EGFRCR SERPLBLD CKD-EPI 2021: 88 ML/MIN/1.73M2
FOLATE SERPL-MCNC: 19.5 NG/ML
GLUCOSE SERPL-MCNC: 96 MG/DL
HDLC SERPL-MCNC: 74 MG/DL
LDLC SERPL-MCNC: 125 MG/DL
NONHDLC SERPL-MCNC: 145 MG/DL
POTASSIUM SERPL-SCNC: 4.8 MMOL/L
PROT SERPL-MCNC: 7.2 G/DL
SODIUM SERPL-SCNC: 138 MMOL/L
TRIGL SERPL-MCNC: 117 MG/DL
VIT B12 SERPL-MCNC: 702 PG/ML

## 2025-07-14 ENCOUNTER — NON-APPOINTMENT (OUTPATIENT)
Age: 50
End: 2025-07-14